# Patient Record
Sex: FEMALE | Employment: FULL TIME | URBAN - METROPOLITAN AREA
[De-identification: names, ages, dates, MRNs, and addresses within clinical notes are randomized per-mention and may not be internally consistent; named-entity substitution may affect disease eponyms.]

---

## 2019-04-01 ENCOUNTER — OFFICE VISIT (OUTPATIENT)
Dept: ORTHOPEDIC SURGERY | Age: 49
End: 2019-04-01

## 2019-04-01 VITALS
TEMPERATURE: 98.1 F | RESPIRATION RATE: 16 BRPM | DIASTOLIC BLOOD PRESSURE: 45 MMHG | WEIGHT: 150 LBS | OXYGEN SATURATION: 100 % | SYSTOLIC BLOOD PRESSURE: 118 MMHG | HEART RATE: 64 BPM

## 2019-04-01 DIAGNOSIS — M46.1 SACROILIITIS (HCC): ICD-10-CM

## 2019-04-01 DIAGNOSIS — M79.18 CERVICAL MYOFASCIAL PAIN SYNDROME: ICD-10-CM

## 2019-04-01 DIAGNOSIS — M79.18 MYOFASCIAL PAIN: ICD-10-CM

## 2019-04-01 DIAGNOSIS — M54.59 MECHANICAL LOW BACK PAIN: ICD-10-CM

## 2019-04-01 DIAGNOSIS — M54.2 NECK PAIN: Primary | ICD-10-CM

## 2019-04-01 RX ORDER — LEVONORGESTREL AND ETHINYL ESTRADIOL 0.15-0.03
KIT ORAL
COMMUNITY
Start: 2018-12-01 | End: 2019-12-01

## 2019-04-01 NOTE — PROGRESS NOTES
[address]  [address], [address]  [place]  Phone: [phone]  Fax: [phone]        Lobito Ewing  : 1970  PCP: Jeannette Barney NP  2019    NEW PATIENT      HISTORY OF PRESENT ILLNESS  Farzana Alatorre is a 57 y.o. female c/o neck pain, especially with rotation of her neck. She denies radicular symptoms or weakness. She is a long-distance runner, and notes that she has had pain in her lower back, but it has now moved up to her neck. She previously had chiropractic care, but has not been since her chiropractor moved a couple of years ago. She has to sleep on her back with no pillow as lying on her side or with a pillow exacerbates her pain. Cervical MRI /: Minimal/very mild degenerative changes at C3-4; otherwise within normal limits. No significant spinal canal or neural foraminal narrowing. She notes that her pain is more of an annoyance, and she wants to ensure that there is no pathology that would be exacerbated by her running. Maintains a diet avoiding red meats and obtaining her protein from fish; she eats organically and avoids most medications. She is interested in discussing the option of acupuncture. She also c/o low back pain and episodic muscle spasms where she has to crawl because she cannot move to walk and \"sciatica. \" She was previously given a shoe lift for a leg length discrepancy. She rates her pain as a 0/10 today. ASSESSMENT  Her episodic neck pain is likely myofascial in nature likely compensatory for her slight postural instability. Her low back and left buttock pain may be due to lumbar myofascial pain and a mild sacroiliitis on the L. PLAN  1. Advised on beneficial modalities: massage, Theracane, etc.  2. Referral to PT [facility]) with dry needling and functional analysis. Pt will f/u in 6 weeks or sooner if needed. Diagnoses and all orders for this visit:    1.  Neck pain  - REFERRAL TO PHYSICAL THERAPY    2. Cervical myofascial pain syndrome  -     REFERRAL TO PHYSICAL THERAPY    3. Mechanical low back pain  -     REFERRAL TO PHYSICAL THERAPY    4. Myofascial pain  -     REFERRAL TO PHYSICAL THERAPY    5. Sacroiliitis ([address]  -     REFERRAL TO PHYSICAL THERAPY        CHIEF COMPLAINT  Farzana Corral is seen today in consultation at the request of Susie Winkler NP for complaints of neck pain. PAST MEDICAL HISTORY   No past medical history on file. No past surgical history on file. MEDICATIONS        ALLERGIES  Allergies not on file       SOCIAL HISTORY    Social History     Socioeconomic History    Marital status:      Spouse name: Not on file    Number of children: Not on file    Years of education: Not on file    Highest education level: Not on file       FAMILY HISTORY  No family history on file. REVIEW OF SYSTEMS  Review of Systems   Musculoskeletal: Positive for back pain and neck pain. PHYSICAL EXAMINATION  Visit Vitals  /45   Pulse 64   Temp 98.1 °F (36.7 °C) (Oral)   Resp 16   Wt 150 lb (68 kg)   SpO2 100%         No flowsheet data found. Constitutional:  Well developed, well nourished, in no acute distress. Psychiatric: Affect and mood are appropriate. HEENT: Normocephalic, atraumatic. Extraocular movements intact. Integumentary: No rashes or abrasions noted on exposed areas. Cardiovascular: Regular rate and rhythm. Pulmonary: Clear to auscultation bilaterally. SPINE/MUSCULOSKELETAL EXAM    Cervical spine:  Neck is midline. Normal muscle tone. No focal atrophy is noted. ROM pain free. Shoulder ROM intact. No tenderness to palpation. Negative Spurling's sign. Negative Tinel's sign. Negative Solis's sign. Sensation in the bilateral arms grossly intact to light touch. R shoulder slightly higher than L. Lumbar spine:  No rash, ecchymosis, or gross obliquity. No fasciculations. No focal atrophy is noted. No pain with hip ROM. Full range of motion. No tenderness to palpation. No tenderness to palpation at the sciatic notch. SI joint mildly tender on the L. Trochanters non tender. Sensation in the bilateral legs grossly intact to light touch. LEFT RIGHT   RHINA TEST - -   P4 TEST - -   COMPRESSION TEST - -   DISTRACTION TEST - -   GAENSLEN'S TEST - -         MOTOR:      Biceps  Triceps Deltoids Wrist Ext Wrist Flex Hand Intrin   Right 5/5 5/5 5/5 5/5 5/5 5/5   Left 5/5 5/5 5/5 5/5 5/5 5/5             Hip Flex  Quads Hamstrings Ankle DF EHL Ankle PF   Right 5/5 5/5 5/5 5/5 5/5 5/5   Left 5/5 5/5 5/5 5/5 5/5 5/5     DTRs are 2+ biceps, triceps, brachioradialis, patella, and Achilles. Negative Straight Leg raise. Squat not tested. No difficulty with tandem gait. Ambulation without assistive device. FWB. RADIOGRAPHS  Cervical MRI images taken on 2/13/19 personally reviewed with patient:  There is decrease in the T2 signal of the intervertebral disc without decrease in the disc height at C2-C7. C0-C1 and C1-2: Congruent. C2-3: There is no evidence of posterior disc abnormality, spinal canal or neural foraminal stenosis. C3-4: There is a small disc osteophyte complex in the region of the left uncovertebral joint. No significant spinal canal or neural foraminal narrowing is seen. C4-T1: There is no evidence of posterior disc abnormality, spinal canal or neural foraminal stenosis. T1-T4: There is no evidence of posterior disc abnormality, spinal canal or neural foraminal stenosis. No axial images were done through these levels. Cervical and demonstrated upper thoracic vertebrae are normal in height and adequately aligned. There is a straightening of the natural cervical lordosis. Cervical and demonstrated upper thoracic cord is of normal size and there is no evidence of abnormal signal within it.     IMPRESSION:  Minimal/very mild degenerative changes at C3-4; otherwise within normal limits. No significant spinal canal or neural foraminal narrowing.  reviewed    Ms. Corral has a reminder for a \"due or due soon\" health maintenance. I have asked that she contact her primary care provider for follow-up on this health maintenance. 32 minutes of face-to-face contact were spent with the patient during today's visit extensively discussing symptoms and treatment plan. All questions were answered. More than half of this visit today was spent on counseling. Written by John Donaldson, as dictated by Dr. Ivette Ruiz. I, Dr. Ivette Ruiz, confirm that all documentation is accurate.

## 2019-04-01 NOTE — PATIENT INSTRUCTIONS
Neck: Exercises  Your Care Instructions  Here are some examples of typical rehabilitation exercises for your condition. Start each exercise slowly. Ease off the exercise if you start to have pain. Your doctor or physical therapist will tell you when you can start these exercises and which ones will work best for you. How to do the exercises  Neck stretch    1. This stretch works best if you keep your shoulder down as you lean away from it. To help you remember to do this, start by relaxing your shoulders and lightly holding on to your thighs or your chair. 2. Tilt your head toward your shoulder and hold for 15 to 30 seconds. Let the weight of your head stretch your muscles. 3. If you would like a little added stretch, use your hand to gently and steadily pull your head toward your shoulder. For example, keeping your right shoulder down, lean your head to the left. 4. Repeat 2 to 4 times toward each shoulder. Diagonal neck stretch    1. Turn your head slightly toward the direction you will be stretching, and tilt your head diagonally toward your chest and hold for 15 to 30 seconds. 2. If you would like a little added stretch, use your hand to gently and steadily pull your head forward on the diagonal.  3. Repeat 2 to 4 times toward each side. Dorsal glide stretch    1. Sit or stand tall and look straight ahead. 2. Slowly tuck your chin as you glide your head backward over your body  3. Hold for a count of 6, and then relax for up to 10 seconds. 4. Repeat 8 to 12 times. Chest and shoulder stretch    1. Sit or stand tall and glide your head backward as in the dorsal glide stretch. 2. Raise both arms so that your hands are next to your ears. 3. Take a deep breath, and as you breathe out, lower your elbows down and behind your back. You will feel your shoulder blades slide down and together, and at the same time you will feel a stretch across your chest and the front of your shoulders.   4. Hold for about 6 seconds, and then relax for up to 10 seconds. 5. Repeat 8 to 12 times. Strengthening: Hands on head    1. Move your head backward, forward, and side to side against gentle pressure from your hands, holding each position for about 6 seconds. 2. Repeat 8 to 12 times. Follow-up care is a key part of your treatment and safety. Be sure to make and go to all appointments, and call your doctor if you are having problems. It's also a good idea to know your test results and keep a list of the medicines you take. Where can you learn more? Go to http://nayla-adonay.info/. Enter P975 in the search box to learn more about \"Neck: Exercises. \"  Current as of: September 20, 2018  Content Version: 11.9  © 2268-3326 [employer]. Care instructions adapted under license by [employer] (which disclaims liability or warranty for this information). If you have questions about a medical condition or this instruction, always ask your healthcare professional. [place] [address] any warranty or liability for your use of this information. Shoulder Blade: Exercises  Your Care Instructions  Here are some examples of typical exercises for your condition. Start each exercise slowly. Ease off the exercise if you start to have pain. Your doctor or physical therapist will tell you when you can start these exercises and which ones will work best for you. How to do the exercises  Shoulder roll    1. Stand tall with your chin slightly tucked. Imagine that a string at the top of your head is pulling you straight up. 2. Keep your arms relaxed. All motion will be in your shoulders. 3. Shrug your shoulders up toward your ears, then up and back. [place] your shoulders down and back, like you're sliding your hands down into your back pants pockets. 4. Repeat the circles at least 2 to 4 times. 5. This exercise is also helpful anytime you want to relax.     Lower neck and upper back stretch    1. With your arms about shoulder height, clasp your hands in front of you. 2. Drop your chin toward your chest.  3. Reach straight forward so you are rounding your upper back. Think about pulling your shoulder blades apart. Joselo Ortiz feel a stretch across your upper back and shoulders. Hold for at least 6 seconds. 4. Repeat 2 to 4 times. Triceps stretch    1. Reach your arm straight up. 2. Keeping your elbow in place, bend your arm and reach your hand down behind your back. 3. With your other hand, apply gentle pressure to the bent elbow. Joselo Ortiz feel a stretch at the back of your upper arm and shoulder. Hold about 6 seconds. 4. Repeat 2 to 4 times with each arm. Shoulder stretch    1. Relax your shoulders. 2. Raise one arm to shoulder height, and reach it across your chest.  3. Pull the arm slightly toward you with your other arm. This will help you get a gentle stretch. Hold for about 6 seconds. 4. Repeat 2 to 4 times. Shoulder blade squeeze    1. Sit or stand up tall with your arms at your sides. 2. Keep your shoulders relaxed and down, not shrugged. 3. Squeeze your shoulder blades together. Hold for 6 seconds, then relax. 4. Repeat 8 to 12 times. Straight-arm shoulder blade squeeze    1. Sit or stand tall. Relax your shoulders. 2. With palms down, hold your elastic tubing or band straight out in front of you. 3. Start with slight tension in the tubing or band, with your hands about shoulder-width apart. 4. Slowly pull straight out to the sides, squeezing your shoulder blades together. Keep your arms straight and at shoulder height. Slowly release. 5. Repeat 8 to 12 times. Rowing    1. [place] your elastic tubing or band at about waist height. Take one end in each hand. 2. Sit or stand with your feet hip-width apart. 3. Hold your arms straight in front of you. Adjust your distance to create slight tension in the tubing or band. 4. Slightly tuck your chin.  Relax your shoulders. 5. Without shrugging your shoulders, pull straight back. Your elbows will pass alongside your waist.    Pull-downs    1. [place] your elastic tubing or band in the top of a closed door. Take one end in each hand. 2. Either sit or stand, depending on what is more comfortable. If you feel unsteady, sit on a chair. 3. Start with your arms up and comfortably apart, elbows straight. There should be a slight tension in the tubing or band. 4. Slightly tuck your chin, and look straight ahead. 5. Keeping your back straight, slowly pull down and back, bending your elbows. 6. Stop where your hands are level with your chin, in a \"goalpost\" position. 7. Repeat 8 to 12 times. Chest T stretch    1. Lie on your back. Raise your knees so they are bent. Plant your feet on the floor, hip-width apart. 2. Tuck your chin, and relax your shoulders. 3. Reach your arms straight out to the sides. If you don't feel a mild stretch in your shoulders and across your chest, use a foam roll or a tightly rolled blanket under your spine, from your tailbone to your head. 4. Relax in this position for at least 15 to 30 seconds while you breathe normally. Repeat 2 to 4 times. 5. As you get used to this stretch, keep adding a little more time until you are able relax in this position for 2 or 3 minutes. When you can relax for at least 2 minutes, you only need to do the exercise 1 time per session. Chest goalpost stretch    1. Lie on your back. Raise your knees so they are bent. Plant your feet on the floor, hip-width apart. 2. Tuck your chin, and relax your shoulders. 3. Reach your arms straight out to the sides. 4. Bend your arms at the elbows, with your hands pointed toward the top of your head. Your arms should make an L on either side of your head. Your palms should be facing up.   5. If you don't feel a mild stretch in your shoulders and across your chest, use a foam roll or tightly rolled blanket under your spine, from your tailbone to your head. 6. Relax in this position for at least 15 to 30 seconds while you breathe normally. Repeat 2 to 4 times. 7. Each day you do this exercise, add a little more time until you can relax in this position for 2 or 3 minutes. When you can relax for at least 2 minutes, you only need to do the exercise 1 time per session. Follow-up care is a key part of your treatment and safety. Be sure to make and go to all appointments, and call your doctor if you are having problems. It's also a good idea to know your test results and keep a list of the medicines you take. Where can you learn more? Go to http://naylaVirtruadonay.info/. Enter (06) [id number] in the search box to learn more about \"Shoulder Blade: Exercises. \"  Current as of: September 20, 2018  Content Version: 11.9  © 0278-1151 [employer]. Care instructions adapted under license by [employer] (which disclaims liability or warranty for this information). If you have questions about a medical condition or this instruction, always ask your healthcare professional. [place] [address] any warranty or liability for your use of this information. Sacroiliac Joint Pain: Care Instructions  Your Care Instructions    The sacroiliac joints connect the spine and each side of the pelvis. These joints bear the weight and stress of your torso. This makes them easy to injure. Injury or overuse of these joints may cause low back pain. Stress on these joints can cause joint pain. Sacroiliac joint pain is more common in pregnant women. Certain kinds of arthritis also may cause this type of joint pain. Home treatment may help you feel better. So can avoiding activities that stress your back. Your doctor also may recommend physical therapy. This may include doing exercises and stretches to help with pain. You may also learn to use good posture.   Follow-up care is a key part of your treatment and safety. Be sure to make and go to all appointments, and call your doctor if you are having problems. It's also a good idea to know your test results and keep a list of the medicines you take. How can you care for yourself at home? · Ask your doctor about light exercises that may help your back pain. Try to do light activity throughout the day. But make sure to take rests as needed. Find a comfortable position for rest, but don't stay in one position for too long. Avoid activities that cause pain. · To apply heat, put a warm water bottle, a heating pad set on low, or a warm cloth on your back. Do not go to sleep with a heating pad on your skin. · Put ice or a cold pack on your back for 10 to 20 minutes at a time. Put a thin cloth between the ice and your skin. · If the doctor gave you a prescription medicine for pain, take it as prescribed. · If you are not taking a prescription pain medicine, ask your doctor if you can take an over-the-counter pain medicine, such as acetaminophen (Tylenol), ibuprofen (Advil, Motrin), or naproxen (Aleve). Read and follow all instructions on the label. Take pain medicines exactly as directed. · Do not take two or more pain medicines at the same time unless the doctor told you to. Many pain medicines have acetaminophen, which is Tylenol. Too much acetaminophen (Tylenol) can be harmful. · To prevent future back pain, do exercises to stretch and strengthen your back and stomach. Learn how to use good posture, safe lifting techniques, and proper body mechanics. When should you call for help? Call 911 anytime you think you may need emergency care. For example, call if:    · You are unable to move a leg at all.   [facility] your doctor now or seek immediate medical care if:    · You have new or worse symptoms in your legs or buttocks. Symptoms may include:  ? Numbness or tingling. ? Weakness.   ? Pain.     · You lose bladder or bowel control.    Watch closely for changes in your health, and be sure to contact your doctor if:    · You are not getting better as expected. Where can you learn more? Go to http://nayla-adonay.info/. Enter Z268 in the search box to learn more about \"Sacroiliac Joint Pain: Care Instructions. \"  Current as of: September 20, 2018  Content Version: 11.9  © 6081-8770 [employer]. Care instructions adapted under license by [employer] (which disclaims liability or warranty for this information). If you have questions about a medical condition or this instruction, always ask your healthcare professional. [address] any warranty or liability for your use of this information. Sacroiliac Pain: Exercises  Your Care Instructions  Here are some examples of typical rehabilitation exercises for your condition. Start each exercise slowly. Ease off the exercise if you start to have pain. Your doctor or physical therapist will tell you when you can start these exercises and which ones will work best for you. How to do the exercises  Knee-to-chest stretch    5. Do not do the knee-to-chest exercise if it causes or increases back or leg pain. 6. Lie on your back with your knees bent and your feet flat on the floor. You can put a small pillow under your head and neck if it is more comfortable. 7. Grasp your hands under one knee and bring the knee to your chest, keeping the other foot flat on the floor. 8. Keep your lower back pressed to the floor. Hold for at least 15 to 30 seconds. 9. Relax and lower the knee to the starting position. Repeat with the other leg. 10. Repeat 2 to 4 times with each leg. 11. To get more stretch, keep your other leg flat on the floor while pulling your knee to your chest.    Bridging    4. Lie on your back with both knees bent. Your knees should be bent about 90 degrees. 5. Tighten your belly muscles by pulling in your belly button toward your spine.  Then push your feet into the floor, squeeze your buttocks, and lift your hips off the floor until your shoulders, hips, and knees are all in a straight line. 6. Hold for about 6 seconds as you continue to breathe normally, and then slowly lower your hips back down to the floor and rest for up to 10 seconds. 7. Repeat 8 to 12 times. Hip extension    5. Get down on your hands and knees on the floor. 6. Keeping your back and neck straight, lift one leg straight out behind you. When you lift your leg, keep your hips level. Don't let your back twist, and don't let your hip drop toward the floor. 7. Hold for 6 seconds. Repeat 8 to 12 times with each leg. 8. If you feel steady and strong when you do this exercise, you can make it more difficult. To do this, when you lift your leg, also lift the opposite arm straight out in front of you. For example, lift the left leg and the right arm at the same time. (This is sometimes called the \"bird dog exercise. \") Hold for 6 seconds, and repeat 8 to 12 times on each side. Clamshell    6. Lie on your side with a pillow under your head. Keep your feet and knees together and your knees bent. 7. Raise your top knee, but keep your feet together. Do not let your hips roll back. Your legs should open up like a clamshell. 8. Hold for 6 seconds. 9. Slowly lower your knee back down. Rest for 10 seconds. 10. Repeat 8 to 12 times. 11. Switch to your other side and repeat steps 1 through 5. Hamstring wall stretch    3. Lie on your back in a doorway, with one leg through the open door. 4. Slide your affected leg up the wall to straighten your knee. You should feel a gentle stretch down the back of your leg. 1. Do not arch your back. 2. Do not bend either knee. 3. Keep one heel touching the floor and the other heel touching the wall. Do not point your toes. 5. Hold the stretch for at least 1 minute to begin. Then try to lengthen the time you hold the stretch to as long as 6 minutes.   6. Switch legs, and repeat steps 1 through 3.  7. Repeat 2 to 4 times. 8. If you do not have a place to do this exercise in a doorway, there is another way to do it:  9. Lie on your back, and bend one knee. 10. Loop a towel under the ball and toes of that foot, and hold the ends of the towel in your hands. 11. Straighten your knee, and slowly pull back on the towel. You should feel a gentle stretch down the back of your leg. 12. Switch legs, and repeat steps 1 through 3.  13. Repeat 2 to 4 times. Lower abdominal strengthening    1. Lie on your back with your knees bent and your feet flat on the floor. 2. Tighten your belly muscles by pulling your belly button in toward your spine. 3. Lift one foot off the floor and bring your knee toward your chest, so that your knee is straight above your hip and your leg is bent like the letter \"L. \"  4. Lift the other knee up to the same position. 5. Lower one leg at a time to the starting position. 6. Keep alternating legs until you have lifted each leg 8 to 12 times. 7. Be sure to keep your belly muscles tight and your back still as you are moving your legs. Be sure to breathe normally. Piriformis stretch    1. Lie on your back with your legs straight. 2. Lift your affected leg, and bend your knee. With your opposite hand, reach across your body, and then gently pull your knee toward your opposite shoulder. 3. Hold the stretch for 15 to 30 seconds. 4. Switch legs and repeat steps 1 through 3.  5. Repeat 2 to 4 times. Follow-up care is a key part of your treatment and safety. Be sure to make and go to all appointments, and call your doctor if you are having problems. It's also a good idea to know your test results and keep a list of the medicines you take. Where can you learn more? Go to http://nayla-adonay.info/. Enter I208 in the search box to learn more about \"Sacroiliac Pain: Exercises. \"  Current as of: September 20, 2018  Content Version: 11.9  © 1005-6178 [employer]. Care instructions adapted under license by [employer] (which disclaims liability or warranty for this information). If you have questions about a medical condition or this instruction, always ask your healthcare professional. [address] any warranty or liability for your use of this information.

## 2019-05-13 ENCOUNTER — OFFICE VISIT (OUTPATIENT)
Dept: ORTHOPEDIC SURGERY | Age: 49
End: 2019-05-13

## 2019-05-13 VITALS
BODY MASS INDEX: 25.44 KG/M2 | HEART RATE: 72 BPM | SYSTOLIC BLOOD PRESSURE: 96 MMHG | WEIGHT: 149 LBS | DIASTOLIC BLOOD PRESSURE: 57 MMHG | HEIGHT: 64 IN | OXYGEN SATURATION: 99 % | RESPIRATION RATE: 18 BRPM | TEMPERATURE: 98.2 F

## 2019-05-13 DIAGNOSIS — M54.59 MECHANICAL LOW BACK PAIN: ICD-10-CM

## 2019-05-13 DIAGNOSIS — M79.18 CERVICAL MYOFASCIAL PAIN SYNDROME: ICD-10-CM

## 2019-05-13 DIAGNOSIS — M46.1 SACROILIITIS (HCC): ICD-10-CM

## 2019-05-13 DIAGNOSIS — M54.2 NECK PAIN: Primary | ICD-10-CM

## 2019-05-13 DIAGNOSIS — M79.18 MYOFASCIAL PAIN: ICD-10-CM

## 2019-05-15 ENCOUNTER — DOCUMENTATION ONLY (OUTPATIENT)
Dept: ORTHOPEDIC SURGERY | Age: 49
End: 2019-05-15

## 2019-05-15 NOTE — PROGRESS NOTES
Luke 12 referral has been submitted for Physical Therapy to be scheduled @ [facility] [place].

## 2019-05-31 ENCOUNTER — HOSPITAL ENCOUNTER (OUTPATIENT)
Dept: PHYSICAL THERAPY | Age: 49
Discharge: HOME | End: 2019-05-31
Payer: OTHER GOVERNMENT

## 2019-05-31 PROCEDURE — 97161 PT EVAL LOW COMPLEX 20 MIN: CPT

## 2019-05-31 NOTE — PROGRESS NOTES
PHYSICAL THERAPY - DAILY TREATMENT NOTE    Patient Name: Dana Hansen        Date: 2019  : 1970   yes Patient  Verified  Visit #:     Insurance: Payor:  / Plan: Marlyn Cunha / Product Type:  /      In time: 1100 Out time: 1130   Total Treatment Time: 30     Medicare/[employer] Time Tracking (below)   Total Timed Codes (min):  30 1:1 Treatment Time:  30     TREATMENT AREA =  Neck pain [M54.2]    SUBJECTIVE  Pain Level (on 0 to 10 scale):  0  / 10   Medication Changes/New allergies or changes in medical history, any new surgeries or procedures?    no  If yes, update Summary List   Subjective Functional Status/Changes:  []  No changes reported     SEE POC         OBJECTIVE    30 min Evaluation       Billed With/As:   [] TE   [] TA   [] Neuro   [] Self Care Patient Education: [x] Review HEP    [] Progressed/Changed HEP based on:   [] positioning   [] body mechanics   [] transfers   [] heat/ice application    [x] other: discussed therapy protocol for DN     Other Objective/Functional Measures:    SEE POC     Post Treatment Pain Level (on 0 to 10) scale:   0  / 10     ASSESSMENT  Assessment/Changes in Function:     Evaluation Code Complexity: History LOW Complexity : Zero comorbidities / personal factors that will impact the outcome / POC; Examination HIGH Complexity : 4+ Standardized tests and measures addressing body structure, function, activity limitation and / or participation in recreation ; Presentation MEDIUM Complexity : Evolving with changing characteristics ; Decision Making MEDIUM Complexity : FOTO score of 26-74;  Complexity LOW     Justification for Eval Code Complexity:  Patient History : None  Examination SEE ABOVE EXAM  Clinical Presentation: evolving pain  Clinical Decision Making : [id number]         []  See Progress Note/Recertification   Patient will continue to benefit from skilled PT services to modify and progress therapeutic interventions, address functional mobility deficits, address ROM deficits, address strength deficits, analyze and address soft tissue restrictions, analyze and cue movement patterns, analyze and modify body mechanics/ergonomics and assess and modify postural abnormalities to attain remaining goals. Progress toward goals / Updated goals:    SEE POC     PLAN  []  Upgrade activities as tolerated yes Continue plan of care   []  Discharge due to :    [x]  Other: Pt will be seen 2 times per week for 8-12 sessions.       Therapist: Ivonne Nava PT, DPT    Date: 5/31/2019 Time: 10:57 AM     Future Appointments   Date Time Provider Ann Marie Ward   5/31/2019 11:00 AM Daija Sanchez [facility]   6/17/2019  2:15 PM Rosanna Aggarwal  [address]

## 2019-05-31 NOTE — PROGRESS NOTES
[address] [facility] PHYSICAL THERAPY  [address] [place] [address], [facility] - Phone: [phone]  Fax: [phone] / [phone] [facility]  Patient Name: Karen Perry : 1970   Medical   Diagnosis: Neck pain [M54.2] Treatment Diagnosis: Neck pain [M54.2]   Onset Date: Chronic     Referral Source: Gio Fontenot MD [facility]): 2019   Prior Hospitalization: See medical history Provider #: [id number]   Prior Level of Function: I with ADL's, Recreational running   Comorbidities: None Listed   Medications: Verified on Patient Summary List   The Plan of Care and following information is based on the information from the initial evaluation.   ==================================================================================  Assessment / sheehan information:   Karen Perry is a 54 y.o.  yo female with neck and back pain that has been chronic for several years. Pt has been managing pain with chiropractic care for several months, however has not been recently. Pt reports pain in the upper back and neck following longer periods of sitting at her desk. Pain in the lower back following longer periods of running. Pt reports pain mainly on the L side of the upper and lower back with increase feeling of pinching down the R UE and LE that is intermittent. Pt denies any red flags at this time. Pt rates pain as 9/10 max, 3/10 at best.  Pain better with stretching and movement, worse with sitting and laying. Objective: FOTO score = 73 (an established functional score where 100 = no disability). Posture Mild FHRS posture in sitting, L LE shorter then the R with supine bridge and tightness of the L ASIS and L/S paraspinals with trigger points. Gait WNL. Palpation TTP along the L interscapular muscles, L/S paraspinals. AROM of the C/S and L/S WNL in all directions.  Strength Grossly 5/5 in the BL LE's and UE's with no pain. Moderate tightness of the UT, LV, interscapular muscles, L/S paraspinals, QL, and piriformis. Functional limitations at this time include decreased running, decreased recreational activities. .  The patient was instructed in a home exercise program to address the above findings/deficits. The patient would benefit from skilled physical therapy at this time to address the above functional deficits. Pt would benefit from Dry Needling to address muscle tension and abnormal movement patterns with recreational and running activities.   ==================================================================================  Eval Complexity: History LOW Complexity : Zero comorbidities / personal factors that will impact the outcome / POC;  Examination  HIGH Complexity : 4+ Standardized tests and measures addressing body structure, function, activity limitation and / or participation in recreation ; Presentation MEDIUM Complexity : Evolving with changing characteristics ; Decision Making MEDIUM Complexity : FOTO score of 26-74; Overall Complexity LOW   Problem List: pain affecting function, impaired gait/ balance, decrease ADL/ functional abilitiies, decrease activity tolerance, decrease flexibility/ joint mobility and decrease transfer abilities   Treatment Plan may include any combination of the following: Therapeutic exercise, Therapeutic activities, Neuromuscular re-education, Manual therapy, Patient education and Functional mobility training  Patient / Family readiness to learn indicated by: asking questions, trying to perform skills and interest  Persons(s) to be included in education: patient (P)   Barriers to Learning/Limitations: None  Measures taken, if barriers to learning:    Patient Goal (s): Decrease neck and back pain/tightness   Patient self reported health status: good  Rehabilitation Potential: good   Short Term Goals:  To be accomplished in  4  treatments:  Pt will be I with HEP in order to improve tolerance with functional ADLs and work duties  Pt will report max pain <3/10 in order to improve tolerance with functional ADL's   Long Term Goals: To be accomplished in  8  treatments:  1. Improve FOTO outcome score to 78/100 in order to indicate a significant improvement in ADL function. 2. Pt to report +5 or > on GROC to improve functional mobility for ADLs. 3. Pt will report 75% overall improvement in functional ADL's in order to return to PLOF. 4. Patient will be independent with long term HEP in order to prepare for DC to home. Frequency / Duration:   Patient to be seen  2  times per week for 8-12  treatments:  Patient / Caregiver education and instruction: exercises  G-Codes (GP): YOSHI  Therapist Signature: Marimar Boyd PT, DPT   Date: 8/45/9397   Certification Period: NA Time: 10:56 AM   ==================================================================================  I certify that the above Physical Therapy Services are being furnished while the patient is under my care. I agree with the treatment plan and certify that this therapy is necessary. Physician Signature:        Date:       Time:     Please sign and return to [facility] at [facility] [place], [facility]. or you may fax the signed copy to [phone]. Thank you.

## 2019-06-12 ENCOUNTER — HOSPITAL ENCOUNTER (OUTPATIENT)
Dept: PHYSICAL THERAPY | Age: 49
Discharge: HOME | End: 2019-06-12
Payer: OTHER GOVERNMENT

## 2019-06-12 PROCEDURE — 97110 THERAPEUTIC EXERCISES: CPT

## 2019-06-12 PROCEDURE — 97140 MANUAL THERAPY 1/> REGIONS: CPT

## 2019-06-14 ENCOUNTER — HOSPITAL ENCOUNTER (OUTPATIENT)
Dept: PHYSICAL THERAPY | Age: 49
Discharge: HOME | End: 2019-06-14
Payer: OTHER GOVERNMENT

## 2019-06-14 PROCEDURE — 97140 MANUAL THERAPY 1/> REGIONS: CPT

## 2019-06-14 PROCEDURE — 97110 THERAPEUTIC EXERCISES: CPT

## 2019-06-14 NOTE — PROGRESS NOTES
PHYSICAL THERAPY - DAILY TREATMENT NOTE    Patient Name: Radha Parsons        Date: 2019  : 1970   yes Patient  Verified  Visit #:   3   of   12  Insurance: Payor: KEYLA / Plan: Bebeto Gomez 74 / Product Type:  /      In time: 7:30 Out time: 8:10   Total Treatment Time: 40     Medicare/[facility] Time Tracking (below)   Total Timed Codes (min):  na 1:1 Treatment Time:  na     TREATMENT AREA =  Neck pain [M54.2]  SUBJECTIVE  Pain Level (on 0 to 10 scale):  0  / 10   Medication Changes/New allergies or changes in medical history, any new surgeries or procedures?    no  If yes, update Summary List   Subjective Functional Status/Changes:  []  No changes reported     I was able to run a little without increase in pain after the last visit.            OBJECTIVE    15 min Therapeutic Exercise:  [x]  See flow sheet   Rationale:      increase ROM, increase strength and improve coordination to improve the patients ability to perform ADLs      25 min Manual Therapy: T4-5 R t/s Mob, L IS ant jayde, L4-5 L ERS, R midfoot mob, DTM L para, psoas   Rationale:      decrease pain, increase ROM and increase tissue extensibility to improve patient's ability to perform ADLs    Billed With/As:   [] TE   [] TA   [] Neuro   [] Self Care Patient Education: [x] Review HEP    [] Progressed/Changed HEP based on:   [] positioning   [] body mechanics   [] transfers   [] heat/ice application    [] other:      Other Objective/Functional Measures:    Cont to have sig increase in soft tissue tension noted at L psoas     Post Treatment Pain Level (on 0 to 10) scale:   0  / 10     ASSESSMENT  Assessment/Changes in Function:     Running analysis showed excessive ant pelvic tilt and hyperlordotic curve of lumbar  L hip drop during R stance phase, heel striking bilaterally, excessive lumbar rotation     []  See Progress Note/Recertification   Patient will continue to benefit from skilled PT services to modify and progress therapeutic interventions, address functional mobility deficits and address ROM deficits to attain remaining goals.    Progress toward goals / Updated goals:    No sig change towards LTGs today     PLAN  [x]  Upgrade activities as tolerated yes Continue plan of care   []  Discharge due to :    []  Other:      Therapist: Rex Mendoza PT    Date: 6/14/2019 Time: 6:29 AM     Future Appointments   Date Time Provider Ann Marie Ward   6/14/2019  7:30 AM Covarrubias Graft, PT [facility]   6/17/2019  2:15 PM Tameka Chong  [address]   6/19/2019  6:30 AM Covarrubias Graft, PT [facility]   6/21/2019  7:00 AM Covarrubias Graft, PT [facility]   6/24/2019  7:30 AM Covarrubias Graft, PT [facility]   6/26/2019  6:30 AM Covarrubias Graft, PT [facility]

## 2019-06-17 ENCOUNTER — OFFICE VISIT (OUTPATIENT)
Dept: ORTHOPEDIC SURGERY | Age: 49
End: 2019-06-17

## 2019-06-17 VITALS
TEMPERATURE: 98.3 F | DIASTOLIC BLOOD PRESSURE: 68 MMHG | HEART RATE: 67 BPM | RESPIRATION RATE: 18 BRPM | BODY MASS INDEX: 25.58 KG/M2 | HEIGHT: 64 IN | SYSTOLIC BLOOD PRESSURE: 100 MMHG

## 2019-06-17 DIAGNOSIS — M46.1 SACROILIITIS (HCC): ICD-10-CM

## 2019-06-17 DIAGNOSIS — M54.59 MECHANICAL LOW BACK PAIN: Primary | ICD-10-CM

## 2019-06-17 DIAGNOSIS — M79.18 MYOFASCIAL PAIN: ICD-10-CM

## 2019-06-17 DIAGNOSIS — M79.18 CERVICAL MYOFASCIAL PAIN SYNDROME: ICD-10-CM

## 2019-06-17 DIAGNOSIS — M54.2 NECK PAIN: ICD-10-CM

## 2019-06-17 NOTE — PROGRESS NOTES
[address]  [address]  [place], [facility] [address]  Phone: [phone]  Fax: [phone]        Martin Cover  : 1970  PCP: Peace Yusuf NP  2019    PROGRESS NOTE      HISTORY OF PRESENT ILLNESS  Mika Tapia is a 54 y.o. female who was seen as a new patient 19 with c/o neck pain, especially with rotation of her neck. She denies radicular symptoms or weakness. She is a long-distance runner, and notes that she has had pain in her lower back, but it has now moved up to her neck. She previously had chiropractic care, but has not been since her chiropractor moved a couple of years ago. She has to sleep on her back with no pillow as lying on her side or with a pillow exacerbates her pain. Cervical MRI /: Minimal/very mild degenerative changes at C3-4; otherwise within normal limits. No significant spinal canal or neural foraminal narrowing. She notes that her pain is more of an annoyance, and she wants to ensure that there is no pathology that would be exacerbated by her running. Maintains a diet avoiding red meats and obtaining her protein from fish; she eats organically and avoids most medications. She is interested in discussing the option of acupuncture. She also c/o low back pain and episodic muscle spasms where she has to crawl because she cannot move to walk and \"sciatica. \" She was previously given a shoe lift for a leg length discrepancy. Mika Tapia comes in to the office today for f/u. She has seen significant improvement with PT and dry needling ([address] [facility]; 19-current). Per PT note, she had anterior pelvic tilt contributing to her pain. Pt notes that she has left hip flexor weakness also contributing to her pain and the PT session dedicated to that was one of the most beneficial. She did not need a shoe lift because they have been working on her pelvic malalignment. She no longer has any neck pain. She continues to run as an HEP. She rates her pain as a 0/10 today. ASSESSMENT  Her residual pain is likely myofascial in nature, although she continues to see significant improvement with PT. PLAN  1. Provided work note stating it would be advantageous for her to use a sitting/standing desk while at work. 2. DME for sitting/standing desk    Pt will f/u PRN. Diagnoses and all orders for this visit:    1. Mechanical low back pain  -     AMB SUPPLY ORDER    2. Myofascial pain  -     AMB SUPPLY ORDER    3. Neck pain  -     AMB SUPPLY ORDER    4. Cervical myofascial pain syndrome  -     AMB SUPPLY ORDER    5. Sacroiliitis ([address]  -     AMB SUPPLY ORDER         PAST MEDICAL HISTORY   No past medical history on file. No past surgical history on file. Dwayne Eagle MEDICATIONS    Current Outpatient Medications   Medication Sig Dispense Refill    levonorgestrel-ethinyl estradiol (NORDETTE) 0.15-0.03 mg tab Take  by mouth. ALLERGIES  No Known Allergies       SOCIAL HISTORY    Social History     Socioeconomic History    Marital status:      Spouse name: Not on file    Number of children: Not on file    Years of education: Not on file    Highest education level: Not on file   Tobacco Use    Smoking status: Never Smoker    Smokeless tobacco: Never Used   Substance and Sexual Activity    Alcohol use: Yes       FAMILY HISTORY  No family history on file. REVIEW OF SYSTEMS  Review of Systems   Musculoskeletal: Positive for back pain. Neck pain (improved)          PHYSICAL EXAMINATION  Visit Vitals  /68   Pulse 67   Temp 98.3 °F (36.8 °C) (Oral)   Resp 18   Ht 5' 4\" (1.626 m)   BMI 25.58 kg/m²       No flowsheet data found. Constitutional:  Well developed, well nourished, in no acute distress. Psychiatric: Affect and mood are appropriate. Integumentary: No rashes or abrasions noted on exposed areas. SPINE/MUSCULOSKELETAL EXAM    Cervical spine:  Neck is midline. Normal muscle tone. No focal atrophy is noted. ROM pain free. Shoulder ROM intact. No tenderness to palpation. Negative Spurling's sign. Negative Tinel's sign. Negative Solis's sign. Sensation in the bilateral arms grossly intact to light touch.      R shoulder slightly higher than L.     Lumbar spine:  No rash, ecchymosis, or gross obliquity. No fasciculations. No focal atrophy is noted. No pain with hip ROM. Full range of motion. No tenderness to palpation. No tenderness to palpation at the sciatic notch. SI joint mildly tender on the L. Trochanters non tender. Sensation in the bilateral legs grossly intact to light touch. MOTOR:      Biceps  Triceps Deltoids Wrist Ext Wrist Flex Hand Intrin   Right 5/5 5/5 5/5 5/5 5/5 5/5   Left 5/5 5/5 5/5 5/5 5/5 5/5             Hip Flex  Quads Hamstrings Ankle DF EHL Ankle PF   Right 5/5 5/5 5/5 5/5 5/5 5/5   Left 5/5 5/5 5/5 5/5 5/5 5/5     DTRs are 2+ biceps, triceps, brachioradialis, patella, and Achilles.     Negative Straight Leg raise. Squat not tested. No difficulty with tandem gait.      Ambulation without assistive device. FWB.       RADIOGRAPHS  Cervical MRI images taken on 2/13/19 personally reviewed with patient:  There is decrease in the T2 signal of the intervertebral disc without decrease in the disc height at C2-C7. C0-C1 and C1-2: Congruent. C2-3: There is no evidence of posterior disc abnormality, spinal canal or neural foraminal stenosis. C3-4: There is a small disc osteophyte complex in the region of the left uncovertebral joint. No significant spinal canal or neural foraminal narrowing is seen. C4-T1: There is no evidence of posterior disc abnormality, spinal canal or neural foraminal stenosis.    T1-T4: There is no evidence of posterior disc abnormality, spinal canal or neural foraminal stenosis. No axial images were done through these levels. Cervical and demonstrated upper thoracic vertebrae are normal in height and adequately aligned. There is a straightening of the natural cervical lordosis. Cervical and demonstrated upper thoracic cord is of normal size and there is no evidence of abnormal signal within it.     IMPRESSION:  Minimal/very mild degenerative changes at C3-4; otherwise within normal limits. No significant spinal canal or neural foraminal narrowing. 13 minutes of face-to-face contact were spent with the patient during today's visit extensively discussing symptoms and treatment plan. All questions were answered. More than half of this visit today was spent on counseling.      Written by Monique Boyd as dictated by Katja De Luna MD

## 2019-06-17 NOTE — PROGRESS NOTES
06/17/19 Farzana Brown is a 54 y.o. female is here for   Chief Complaint   Patient presents with    Follow-up   . Vitals:    06/17/19 1502   BP: 100/68   Pulse: 67   Resp: 18   Temp: 98.3 °F (36.8 °C)   TempSrc: Oral   Height: 5' 4\" (1.626 m)   PainSc:   0 - No pain    Body mass index is 25.58 kg/m². Chart reviewed including medical/surgical/family history. No Known Allergies has a current medication list which includes the following prescription(s): levonorgestrel-ethinyl estradiol. Social History     Tobacco Use    Smoking status: Never Smoker    Smokeless tobacco: Never Used   Substance Use Topics    Alcohol use: Yes    Drug use: Not on file    Not Received     Is someone accompanying this pt? no    Is the patient using any DME equipment during OV? no    Pain Assessment:  No flowsheet data found. Depression Screening:  No flowsheet data found. Learning Assessment:  Learning Assessment 4/1/2019   PRIMARY LEARNER Patient   PRIMARY LANGUAGE ENGLISH   LEARNER PREFERENCE PRIMARY DEMONSTRATION     LISTENING     PICTURES     READING     VIDEOS   ANSWERED BY patient   RELATIONSHIP SELF       Abuse Screening:  No flowsheet data found. Fall Risk  No flowsheet data found. OPIOID RISK TOOL  No flowsheet data found. Pt currently taking Antiplatelet therapy? no    Coordination of Care:  1. Have you been to the ER, urgent care clinic since your last visit? no  Hospitalized since your last visit? no    2.  Have you seen or consulted any other health care providers outside of the [address] [place] [address] since your last visit? no

## 2019-06-17 NOTE — LETTER
NOTIFICATION RETURN TO WORK 
 
6/17/2019 2:50 PM 
 
Ms. Farzana Corral 
[address] [facility] [address] [facility] [address] To Whom It May Concern: 
 
Farzana Corral is currently under the care of [facility]. She is being seen by our office for treatment. It would be advantageous for her to have a sitting/standing desk while working. If there are questions or concerns please have the patient contact our office.  
 
 
 
Sincerely, 
 
 
Godfrey Avila MD

## 2019-06-19 ENCOUNTER — HOSPITAL ENCOUNTER (OUTPATIENT)
Dept: PHYSICAL THERAPY | Age: 49
Discharge: HOME | End: 2019-06-19
Payer: OTHER GOVERNMENT

## 2019-06-19 PROCEDURE — 97110 THERAPEUTIC EXERCISES: CPT

## 2019-06-19 PROCEDURE — 97140 MANUAL THERAPY 1/> REGIONS: CPT

## 2019-06-19 NOTE — PROGRESS NOTES
PHYSICAL THERAPY - DAILY TREATMENT NOTE    Patient Name: Lobo Wade        Date: 2019  : 1970   yes Patient  Verified  Visit #:      of   12  Insurance: Payor: [employer] / Plan: Bebeto Gomez [address] / Product Type:  /      In time: 6:30 Out time: 7:10   Total Treatment Time: 40     Medicare/[facility] Time Tracking (below)   Total Timed Codes (min):  na 1:1 Treatment Time:  na     TREATMENT AREA =  Neck pain [M54.2]  SUBJECTIVE  Pain Level (on 0 to 10 scale):  0  / 10   Medication Changes/New allergies or changes in medical history, any new surgeries or procedures?    no  If yes, update Summary List   Subjective Functional Status/Changes:  []  No changes reported     I have been practicing running and its been going better          OBJECTIVE  25 min Therapeutic Exercise:  [x]  See flow sheet   Rationale:      increase ROM, increase strength and improve coordination to improve the patients ability to perform ADLs      15 min Manual Therapy: DTM R Gm, t/s Mob, shot gun tech, L4 L FRS jayde   Rationale:      decrease pain, increase ROM and increase tissue extensibility to improve patient's ability to perform ADLs    Billed With/As:   [] TE   [] TA   [] Neuro   [] Self Care Patient Education: [x] Review HEP    [] Progressed/Changed HEP based on:   [] positioning   [] body mechanics   [] transfers   [] heat/ice application    [] other:      Other Objective/Functional Measures:    Cont to have increased soft tissue tension noted at R Gm     Post Treatment Pain Level (on 0 to 10) scale:   0  / 10     ASSESSMENT  Assessment/Changes in Function:     Slight instability with 1/2 kneel on R     []  See Progress Note/Recertification   Patient will continue to benefit from skilled PT services to modify and progress therapeutic interventions, address functional mobility deficits, address ROM deficits and address strength deficits to attain remaining goals.    Progress toward goals / Updated goals:    Progressing well with pain reduction      PLAN  [x]  Upgrade activities as tolerated yes Continue plan of care   []  Discharge due to :    []  Other:      Therapist: Rosana Jules PT    Date: 6/19/2019 Time: 6:26 AM     Future Appointments   Date Time Provider Ann Marie Ward   6/19/2019  6:30 AM Hilary Bowles, PT [facility]   6/21/2019  7:00 AM Hilary Bowles PT [facility]   6/24/2019  7:30 AM Hilary Bowles PT [facility]   6/26/2019  6:30 AM Hilary Bowles, PT [facility]

## 2019-06-21 ENCOUNTER — APPOINTMENT (OUTPATIENT)
Dept: PHYSICAL THERAPY | Age: 49
End: 2019-06-21
Payer: OTHER GOVERNMENT

## 2019-06-24 ENCOUNTER — HOSPITAL ENCOUNTER (OUTPATIENT)
Dept: PHYSICAL THERAPY | Age: 49
Discharge: HOME | End: 2019-06-24
Payer: OTHER GOVERNMENT

## 2019-06-24 PROCEDURE — 97110 THERAPEUTIC EXERCISES: CPT

## 2019-06-24 PROCEDURE — 97140 MANUAL THERAPY 1/> REGIONS: CPT

## 2019-06-24 NOTE — PROGRESS NOTES
PHYSICAL THERAPY - DAILY TREATMENT NOTE    Patient Name: Mika Tapia        Date: 2019  : 1970   yes Patient  Verified  Visit #:      12  Insurance: Payor: KEYLA / Plan: Bebeto Gomez 76 / Product Type:  /      In time: 7:30 Out time: 8:05   Total Treatment Time: 35     Medicare/[facility] Time Tracking (below)   Total Timed Codes (min):  na 1:1 Treatment Time:  na     TREATMENT AREA =  Neck pain [M54.2]  SUBJECTIVE  Pain Level (on 0 to 10 scale):  0  / 10   Medication Changes/New allergies or changes in medical history, any new surgeries or procedures?    no  If yes, update Summary List   Subjective Functional Status/Changes:  []  No changes reported     I have felt great all weekend. No pain           OBJECTIVE  25 min Therapeutic Exercise:  [x]  See flow sheet   Rationale:      increase ROM, increase strength and improve coordination to improve the patients ability to perform ADLs      10 min Manual Therapy: DTM R Gm, t/s Mob, L on L SI jayde   Rationale:      decrease pain, increase ROM and increase tissue extensibility to improve patient's ability to perform ADLs       Billed With/As:   [] TE   [] TA   [] Neuro   [] Self Care Patient Education: [x] Review HEP    [] Progressed/Changed HEP based on:   [] positioning   [] body mechanics   [] transfers   [] heat/ice application    [] other:      Other Objective/Functional Measures:    Improved upright posture with running, however demonstrate decreased DF Bilaterally     Post Treatment Pain Level (on 0 to 10) scale:   0  / 10     ASSESSMENT  Assessment/Changes in Function:     FOTO = 96  GROC = +7     []  See Progress Note/Recertification   Patient will continue to benefit from skilled PT services to modify and progress therapeutic interventions, address functional mobility deficits and address ROM deficits to attain remaining goals.    Progress toward goals / Updated goals:    Plan to decrease frequency of PT in near future and progress to DC     PLAN  [x]  Upgrade activities as tolerated yes Continue plan of care   []  Discharge due to :    []  Other:      Therapist: Rex Mendoza PT    Date: 6/24/2019 Time: 6:30 AM     Future Appointments   Date Time Provider Ann Marie Ward   6/24/2019  7:30 AM Satish Stoll, PT [facility]   6/26/2019  6:30 AM Satish Stoll PT [id number]

## 2019-06-26 ENCOUNTER — HOSPITAL ENCOUNTER (OUTPATIENT)
Dept: PHYSICAL THERAPY | Age: 49
Discharge: HOME | End: 2019-06-26
Payer: OTHER GOVERNMENT

## 2019-06-26 PROCEDURE — 97110 THERAPEUTIC EXERCISES: CPT

## 2019-06-26 NOTE — PROGRESS NOTES
PHYSICAL THERAPY - DAILY TREATMENT NOTE    Patient Name: Lona Roth        Date: 2019  : 1970   yes Patient  Verified  Visit #:     Insurance: Payor: [employer]LA / Plan: Yury Chow / Product Type:  /      In time: 6:30 Out time: 6:55   Total Treatment Time: 25     Medicare/[facility] Time Tracking (below)   Total Timed Codes (min):  na 1:1 Treatment Time:  na     TREATMENT AREA =  Neck pain [M54.2]  SUBJECTIVE  Pain Level (on 0 to 10 scale):  0  / 10   Medication Changes/New allergies or changes in medical history, any new surgeries or procedures?    no  If yes, update Summary List   Subjective Functional Status/Changes:  []  No changes reported     I feel so much better. No pain since the last visit. OBJECTIVE  25 min Therapeutic Exercise:  [x]  See flow sheet   Rationale:      increase ROM, increase strength and improve coordination to improve the patients ability to perform ADLs        Billed With/As:   [] TE   [] TA   [] Neuro   [] Self Care Patient Education: [x] Review HEP    [] Progressed/Changed HEP based on:   [] positioning   [] body mechanics   [] transfers   [] heat/ice application    [] other:      Other Objective/Functional Measures:    Demonstrated slight instability with SL plyo     Post Treatment Pain Level (on 0 to 10) scale:   0  / 10     ASSESSMENT  Assessment/Changes in Function:     Cont to demonstrate decreased DF during swing phase     []  See Progress Note/Recertification   Patient will continue to benefit from skilled PT services to modify and progress therapeutic interventions, address functional mobility deficits and address ROM deficits to attain remaining goals.    Progress toward goals / Updated goals:    Anticipate DC in next few visits     PLAN  [x]  Upgrade activities as tolerated yes Continue plan of care   []  Discharge due to :    []  Other:      Therapist: Marium Liriano PT    Date: 2019 Time: 6:25 AM     Future Appointments   Date Time Provider Ann Marie Ward   6/26/2019  6:30 AM Jorge Luis Cristina, PT [facility]

## 2019-07-08 ENCOUNTER — APPOINTMENT (OUTPATIENT)
Dept: PHYSICAL THERAPY | Age: 49
End: 2019-07-08
Payer: OTHER GOVERNMENT

## 2019-07-19 ENCOUNTER — HOSPITAL ENCOUNTER (OUTPATIENT)
Dept: PHYSICAL THERAPY | Age: 49
Discharge: HOME | End: 2019-07-19
Payer: OTHER GOVERNMENT

## 2019-07-19 PROCEDURE — 97110 THERAPEUTIC EXERCISES: CPT

## 2019-07-19 NOTE — PROGRESS NOTES
[address] PHYSICAL THERAPY  [address] [place] [address], [address],[facility], [address] [facility] - Phone: [phone]  Fax: [phone]  DISCHARGE SUMMARY  Patient Name: Twana Cooks : 1970   Treatment/Medical Diagnosis: Neck pain [M54.2]   Referral Source: Chance Gregory MD     Date of Initial Visit: 19 Attended Visits: 7 Missed Visits: 0     SUMMARY OF TREATMENT  Farzana Corral has been seen at our clinic 1-2x/wk for a total of 7 visits. Pt treatment has consisted of  therapeutic exercise for cervical ROM, postural correction, and manual therapy (deep tissue mobilizations and spinal mobilzations)  CURRENT STATUS  Pt has had a good tolerance to physical therapy treatment. She is now pain free with all activities and demonstrates significantly improved efficiency and running form. She continues to present slight tightness in her mid thoracic region. However, we believe that she will be able to continue to manage her symptom independently at this point. Goal/Measure of Progress Goal Met? 1. Pt will report 75% overall improvement in functional ADL's in order to return to PLOF   Status at last Eval: na Current Status: 100% yes   2. Increase FOTO score to 75 % show functional improvement   Status at last Eval: 73% Current Status: 96% yes   3. Will rate >/= +5 on Global Rating of Change and be prepared to DC to HEP. Status at last Eval: na Current Status: +7 yes       RECOMMENDATIONS  Discharge from physical therapy treatment with HEP. Specifics:   If you have any questions/comments please contact us directly at [phone]. Thank you for allowing us to assist in the care of your patient.     Therapist Signature: Court Mason, ALEXUST, OCS, SCS, CSCS Date: 2019     Time: 8:41 AM

## 2019-07-19 NOTE — PROGRESS NOTES
PHYSICAL THERAPY - DAILY TREATMENT NOTE    Patient Name: Jovana Soriano        Date: 2019  : 1970   yes Patient  Verified  Visit #:     Insurance: Payor: KEYLA / Plan: Bebeto Gomez 74 / Product Type:  /      In time: 7:10 Out time: 7:30   Total Treatment Time: 20     Medicare/[facility] Time Tracking (below)   Total Timed Codes (min):  na 1:1 Treatment Time:  na     TREATMENT AREA =  Neck pain [M54.2]  SUBJECTIVE  Pain Level (on 0 to 10 scale):  0  / 10   Medication Changes/New allergies or changes in medical history, any new surgeries or procedures?    no  If yes, update Summary List   Subjective Functional Status/Changes:  []  No changes reported     I have been doing great.   Running feel much more efficient and pain free          OBJECTIVE  20 min Therapeutic Exercise:  [x]  See flow sheet   Rationale:      increase ROM, increase strength and improve coordination to improve the patients ability to perform ADLs       Billed With/As:   [] TE   [] TA   [] Neuro   [] Self Care Patient Education: [x] Review HEP    [] Progressed/Changed HEP based on:   [] positioning   [] body mechanics   [] transfers   [] heat/ice application    [] other:      Other Objective/Functional Measures:    FOTO = 96  GROC = +7     Post Treatment Pain Level (on 0 to 10) scale:   0  / 10     ASSESSMENT  Assessment/Changes in Function:     See DC     []  See Progress Note/Recertification      Progress toward goals / Updated goals:    See DC     PLAN  []  Upgrade activities as tolerated  Continue plan of care   [x]  Discharge due to : Met all goals    []  Other:      Therapist: Sierra Avila PT    Date: 2019 Time: 6:29 AM     Future Appointments   Date Time Provider Ann Marie Ward   2019  7:00 AM Irma Alarcon PT [facility]